# Patient Record
Sex: FEMALE | Race: ASIAN | URBAN - METROPOLITAN AREA
[De-identification: names, ages, dates, MRNs, and addresses within clinical notes are randomized per-mention and may not be internally consistent; named-entity substitution may affect disease eponyms.]

---

## 2017-07-06 ENCOUNTER — APPOINTMENT (RX ONLY)
Dept: URBAN - METROPOLITAN AREA CLINIC 169 | Facility: CLINIC | Age: 42
Setting detail: DERMATOLOGY
End: 2017-07-06

## 2017-07-06 DIAGNOSIS — L70.0 ACNE VULGARIS: ICD-10-CM | Status: INADEQUATELY CONTROLLED

## 2017-07-06 PROCEDURE — ? PRESCRIPTION

## 2017-07-06 PROCEDURE — ? PATIENT SPECIFIC COUNSELING

## 2017-07-06 PROCEDURE — 99202 OFFICE O/P NEW SF 15 MIN: CPT

## 2017-07-06 PROCEDURE — ? COUNSELING

## 2017-07-06 RX ORDER — MINOCYCLINE HYDROCHLORIDE 100 MG/1
CAPSULE ORAL BID
Qty: 30 | Refills: 3 | COMMUNITY
Start: 2017-07-06

## 2017-07-06 RX ORDER — CLINDAMYCIN PHOSPHATE 10 MG/ML
LOTION TOPICAL QD
Qty: 1 | Refills: 3 | COMMUNITY
Start: 2017-07-06

## 2017-07-06 RX ADMIN — CLINDAMYCIN PHOSPHATE 1: 10 LOTION TOPICAL at 00:00

## 2017-07-06 RX ADMIN — MINOCYCLINE HYDROCHLORIDE 1: 100 CAPSULE ORAL at 00:00

## 2017-07-06 ASSESSMENT — LOCATION DETAILED DESCRIPTION DERM
LOCATION DETAILED: LEFT SUPERIOR UPPER BACK
LOCATION DETAILED: LEFT INFERIOR CENTRAL MALAR CHEEK
LOCATION DETAILED: RIGHT INFERIOR CENTRAL MALAR CHEEK
LOCATION DETAILED: LEFT LOWER CUTANEOUS LIP

## 2017-07-06 ASSESSMENT — LOCATION SIMPLE DESCRIPTION DERM
LOCATION SIMPLE: LEFT LIP
LOCATION SIMPLE: LEFT UPPER BACK
LOCATION SIMPLE: LEFT CHEEK
LOCATION SIMPLE: RIGHT CHEEK

## 2017-07-06 ASSESSMENT — LOCATION ZONE DERM
LOCATION ZONE: LIP
LOCATION ZONE: TRUNK
LOCATION ZONE: FACE

## 2017-07-06 ASSESSMENT — SEVERITY ASSESSMENT OVERALL AMONG ALL PATIENTS
IN YOUR EXPERIENCE, AMONG ALL PATIENTS YOU HAVE SEEN WITH THIS CONDITION, HOW SEVERE IS THIS PATIENT'S CONDITION?: MULTIPLE INFLAMMATORY LESIONS BUT NONINFLAMMATORY LESIONS PREDOMINATE

## 2017-07-06 NOTE — PROCEDURE: PATIENT SPECIFIC COUNSELING
patient has moderate inflammatory acne involving her face and back.  I recommend starting treatment with minocycline 100 milligrams daily along with clindamycin lotion twice a day.  Follow up in 2 months to recheck response to treatment.
Detail Level: Zone

## 2017-11-22 ENCOUNTER — APPOINTMENT (RX ONLY)
Dept: URBAN - METROPOLITAN AREA CLINIC 169 | Facility: CLINIC | Age: 42
Setting detail: DERMATOLOGY
End: 2017-11-22

## 2017-11-22 DIAGNOSIS — L70.0 ACNE VULGARIS: ICD-10-CM | Status: IMPROVED

## 2017-11-22 PROCEDURE — ? PATIENT SPECIFIC COUNSELING

## 2017-11-22 PROCEDURE — 99212 OFFICE O/P EST SF 10 MIN: CPT

## 2017-11-22 PROCEDURE — ? PRESCRIPTION

## 2017-11-22 PROCEDURE — ? COUNSELING

## 2017-11-22 RX ORDER — MINOCYCLINE HYDROCHLORIDE 100 MG/1
1 CAPSULE ORAL BID
Qty: 30 | Refills: 3 | Status: ERX

## 2017-11-22 RX ORDER — CLINDAMYCIN PHOSPHATE 10 MG/ML
1 LOTION TOPICAL QD
Qty: 1 | Refills: 11 | Status: ERX

## 2017-11-22 ASSESSMENT — LOCATION DETAILED DESCRIPTION DERM
LOCATION DETAILED: RIGHT INFERIOR CENTRAL MALAR CHEEK
LOCATION DETAILED: LEFT SUPERIOR UPPER BACK
LOCATION DETAILED: LEFT INFERIOR CENTRAL MALAR CHEEK
LOCATION DETAILED: LEFT LOWER CUTANEOUS LIP

## 2017-11-22 ASSESSMENT — LOCATION ZONE DERM
LOCATION ZONE: TRUNK
LOCATION ZONE: LIP
LOCATION ZONE: FACE

## 2017-11-22 ASSESSMENT — LOCATION SIMPLE DESCRIPTION DERM
LOCATION SIMPLE: LEFT LIP
LOCATION SIMPLE: LEFT CHEEK
LOCATION SIMPLE: LEFT UPPER BACK
LOCATION SIMPLE: RIGHT CHEEK

## 2017-11-22 NOTE — PROCEDURE: PATIENT SPECIFIC COUNSELING
acne has significantly improved over the past few months on treatment.  Will have patient discontinue minocycline but she can resume this medication if acne flares.  Continue use of clindamycin lotion.  Refill given for the medication to have on hand.  The patient inquires about acne scarring.  Patient has pitted scarring from acne.  I discussed that only resurfacing the skin would improve these scars.  I recommended Dr. Andry Childs for this cosmetic procedure if she wishes to pursue the treatment.  Patient does not speaking English; translation provided by son.
Detail Level: Simple

## 2024-02-14 ENCOUNTER — APPOINTMENT (RX ONLY)
Dept: URBAN - METROPOLITAN AREA CLINIC 158 | Facility: CLINIC | Age: 49
Setting detail: DERMATOLOGY
End: 2024-02-14

## 2024-02-14 VITALS — WEIGHT: 141 LBS

## 2024-02-14 DIAGNOSIS — B86 SCABIES: ICD-10-CM

## 2024-02-14 PROCEDURE — ? COUNSELING

## 2024-02-14 PROCEDURE — ? PATIENT SPECIFIC COUNSELING

## 2024-02-14 PROCEDURE — ? PRESCRIPTION

## 2024-02-14 PROCEDURE — ? SCABIES PREP

## 2024-02-14 PROCEDURE — 99203 OFFICE O/P NEW LOW 30 MIN: CPT

## 2024-02-14 RX ORDER — CLOBETASOL PROPIONATE 0.5 MG/G
1 CREAM TOPICAL BID
Qty: 60 | Refills: 3 | COMMUNITY
Start: 2024-02-14

## 2024-02-14 RX ORDER — IVERMECTIN 3 MG/1
15MG TABLET ORAL
Qty: 10 | Refills: 1 | COMMUNITY
Start: 2024-02-14

## 2024-02-14 RX ADMIN — IVERMECTIN 15MG: 3 TABLET ORAL at 00:00

## 2024-02-14 RX ADMIN — CLOBETASOL PROPIONATE 1: 0.5 CREAM TOPICAL at 00:00

## 2024-02-14 ASSESSMENT — LOCATION DETAILED DESCRIPTION DERM
LOCATION DETAILED: RIGHT DISTAL PRETIBIAL REGION
LOCATION DETAILED: RIGHT PROXIMAL ULNAR DORSAL FOREARM
LOCATION DETAILED: LEFT PROXIMAL PRETIBIAL REGION
LOCATION DETAILED: LEFT DISTAL PRETIBIAL REGION
LOCATION DETAILED: RIGHT SUPERIOR UPPER BACK
LOCATION DETAILED: RIGHT VENTRAL PROXIMAL FOREARM

## 2024-02-14 ASSESSMENT — LOCATION ZONE DERM
LOCATION ZONE: TRUNK
LOCATION ZONE: LEG
LOCATION ZONE: ARM

## 2024-02-14 ASSESSMENT — LOCATION SIMPLE DESCRIPTION DERM
LOCATION SIMPLE: RIGHT UPPER BACK
LOCATION SIMPLE: RIGHT PRETIBIAL REGION
LOCATION SIMPLE: LEFT PRETIBIAL REGION
LOCATION SIMPLE: RIGHT FOREARM

## 2024-02-14 NOTE — PROCEDURE: SCABIES PREP
Body Location Override (Optional - Billing Will Still Be Based On Selected Body Map Location If Applicable): lower legs
Detail Level: Detailed
Cpt Desired: No Billing

## 2024-02-14 NOTE — PROCEDURE: PATIENT SPECIFIC COUNSELING
I saw her  (Davin Puente, MRN: [id number]) last week and diagnosed him with scabies. The patient has diffusing itching over the trunk and extremities. The exam shows minimal rash. There are scatter excoriations on the legs. Scabies prep was negative. Given that her  has scabies, I will empirically treat her with ivermectin.  I will also give her clobetasol cream for the itching.
Detail Level: Detailed